# Patient Record
Sex: FEMALE | Race: WHITE | NOT HISPANIC OR LATINO
[De-identification: names, ages, dates, MRNs, and addresses within clinical notes are randomized per-mention and may not be internally consistent; named-entity substitution may affect disease eponyms.]

---

## 2022-10-31 ENCOUNTER — APPOINTMENT (OUTPATIENT)
Dept: OBGYN | Facility: CLINIC | Age: 32
End: 2022-10-31

## 2022-10-31 VITALS
WEIGHT: 179 LBS | SYSTOLIC BLOOD PRESSURE: 126 MMHG | HEIGHT: 67 IN | BODY MASS INDEX: 28.09 KG/M2 | DIASTOLIC BLOOD PRESSURE: 64 MMHG

## 2022-10-31 DIAGNOSIS — Z01.411 ENCOUNTER FOR GYNECOLOGICAL EXAMINATION (GENERAL) (ROUTINE) WITH ABNORMAL FINDINGS: ICD-10-CM

## 2022-10-31 DIAGNOSIS — Z83.3 FAMILY HISTORY OF DIABETES MELLITUS: ICD-10-CM

## 2022-10-31 DIAGNOSIS — Z87.19 PERSONAL HISTORY OF OTHER DISEASES OF THE DIGESTIVE SYSTEM: ICD-10-CM

## 2022-10-31 DIAGNOSIS — Z78.9 OTHER SPECIFIED HEALTH STATUS: ICD-10-CM

## 2022-10-31 DIAGNOSIS — Z87.42 PERSONAL HISTORY OF OTHER DISEASES OF THE FEMALE GENITAL TRACT: ICD-10-CM

## 2022-10-31 DIAGNOSIS — Z80.1 FAMILY HISTORY OF MALIGNANT NEOPLASM OF TRACHEA, BRONCHUS AND LUNG: ICD-10-CM

## 2022-10-31 DIAGNOSIS — Z80.8 FAMILY HISTORY OF MALIGNANT NEOPLASM OF OTHER ORGANS OR SYSTEMS: ICD-10-CM

## 2022-10-31 PROCEDURE — 99385 PREV VISIT NEW AGE 18-39: CPT

## 2022-11-20 LAB — CYTOLOGY CVX/VAG DOC THIN PREP: NORMAL

## 2023-09-14 RX ORDER — ASCORBIC ACID, CHOLECALCIFEROL, .ALPHA.-TOCOPHEROL ACETATE, DL-, PYRIDOXINE, FOLIC ACID, CYANOCOBALAMIN, CALCIUM, FERROUS FUMARATE, MAGNESIUM, DOCONEXENT 85; 200; 10; 25; 1; 12; 140; 27; 45; 300 [IU]/1; [IU]/1; [IU]/1; [IU]/1; MG/1; UG/1; MG/1; MG/1; MG/1; MG/1
27-0.6-0.4-3 CAPSULE, GELATIN COATED ORAL
Qty: 90 | Refills: 3 | Status: ACTIVE | COMMUNITY
Start: 2023-09-14 | End: 1900-01-01

## 2023-12-18 ENCOUNTER — APPOINTMENT (OUTPATIENT)
Dept: OBGYN | Facility: CLINIC | Age: 33
End: 2023-12-18
Payer: COMMERCIAL

## 2023-12-18 DIAGNOSIS — E28.2 POLYCYSTIC OVARIAN SYNDROME: ICD-10-CM

## 2023-12-18 DIAGNOSIS — N92.6 IRREGULAR MENSTRUATION, UNSPECIFIED: ICD-10-CM

## 2023-12-18 PROCEDURE — 99213 OFFICE O/P EST LOW 20 MIN: CPT

## 2023-12-18 RX ORDER — OMEPRAZOLE 40 MG/1
40 CAPSULE, DELAYED RELEASE ORAL
Refills: 0 | Status: DISCONTINUED | COMMUNITY
End: 2023-12-18

## 2023-12-18 NOTE — PLAN
[FreeTextEntry1] : Irregular menses and PCOS discussed. Will send for progesterone level next week to determine cycle.  We discussed she may need ovulatory inducing agents and that while I offer Clomid most Rad are using letrozole and I could refer her to [facility]. Pt. states her uncle is an [facility] specialist in [place], and she may consult with him.

## 2023-12-22 PROBLEM — N92.6 IRREGULAR MENSES: Status: ACTIVE | Noted: 2023-12-22

## 2024-04-22 ENCOUNTER — ASOB RESULT (OUTPATIENT)
Age: 34
End: 2024-04-22

## 2024-04-22 ENCOUNTER — TRANSCRIPTION ENCOUNTER (OUTPATIENT)
Age: 34
End: 2024-04-22

## 2024-04-22 ENCOUNTER — APPOINTMENT (OUTPATIENT)
Dept: ANTEPARTUM | Facility: CLINIC | Age: 34
End: 2024-04-22
Payer: COMMERCIAL

## 2024-04-22 PROCEDURE — 76805 OB US >/= 14 WKS SNGL FETUS: CPT

## 2024-04-23 ENCOUNTER — APPOINTMENT (OUTPATIENT)
Dept: PEDIATRIC MEDICAL GENETICS | Facility: CLINIC | Age: 34
End: 2024-04-23
Payer: COMMERCIAL

## 2024-04-23 DIAGNOSIS — Z31.5 ENCOUNTER FOR PROCREATIVE GENETIC COUNSELING: ICD-10-CM

## 2024-04-23 DIAGNOSIS — Z80.8 FAMILY HISTORY OF MALIGNANT NEOPLASM OF OTHER ORGANS OR SYSTEMS: ICD-10-CM

## 2024-04-23 DIAGNOSIS — Z14.8 GENETIC CARRIER OF OTHER DISEASE: ICD-10-CM

## 2024-04-23 DIAGNOSIS — Z80.1 FAMILY HISTORY OF MALIGNANT NEOPLASM OF TRACHEA, BRONCHUS AND LUNG: ICD-10-CM

## 2024-04-23 DIAGNOSIS — Z83.3 FAMILY HISTORY OF DIABETES MELLITUS: ICD-10-CM

## 2024-04-23 PROCEDURE — 96040: CPT | Mod: 95

## 2024-04-23 NOTE — HISTORY OF PRESENT ILLNESS
[Home] : at home, [unfilled] , at the time of the visit. [Medical Office: ([facility])___] : at the medical office located in  [Spouse] : spouse [Verbal consent obtained from patient] : the patient, [unfilled]

## 2024-07-08 ENCOUNTER — NON-APPOINTMENT (OUTPATIENT)
Age: 34
End: 2024-07-08

## 2024-07-08 ENCOUNTER — APPOINTMENT (OUTPATIENT)
Dept: CARDIOLOGY | Facility: CLINIC | Age: 34
End: 2024-07-08
Payer: COMMERCIAL

## 2024-07-08 VITALS
BODY MASS INDEX: 31.39 KG/M2 | HEIGHT: 67 IN | OXYGEN SATURATION: 96 % | SYSTOLIC BLOOD PRESSURE: 120 MMHG | HEART RATE: 72 BPM | DIASTOLIC BLOOD PRESSURE: 79 MMHG | WEIGHT: 200 LBS

## 2024-07-08 DIAGNOSIS — R00.2 PALPITATIONS: ICD-10-CM

## 2024-07-08 PROCEDURE — 93000 ELECTROCARDIOGRAM COMPLETE: CPT

## 2024-07-08 PROCEDURE — G2211 COMPLEX E/M VISIT ADD ON: CPT | Mod: NC

## 2024-07-08 PROCEDURE — 99204 OFFICE O/P NEW MOD 45 MIN: CPT | Mod: 25

## 2024-07-08 PROCEDURE — 93242 EXT ECG>48HR<7D RECORDING: CPT

## 2024-07-10 ENCOUNTER — APPOINTMENT (OUTPATIENT)
Dept: CV DIAGNOSITCS | Facility: HOSPITAL | Age: 34
End: 2024-07-10

## 2024-07-10 ENCOUNTER — RESULT REVIEW (OUTPATIENT)
Age: 34
End: 2024-07-10

## 2024-08-05 ENCOUNTER — APPOINTMENT (OUTPATIENT)
Dept: CARDIOLOGY | Facility: CLINIC | Age: 34
End: 2024-08-05

## 2024-08-05 PROCEDURE — 99443: CPT | Mod: 93

## 2024-08-13 NOTE — HISTORY OF PRESENT ILLNESS
[FreeTextEntry1] : Verbal consent given on 2024 at 11:08 by SHANKAR GUZMAN, 1990  This is a 35 y/o woman who is , EED Sept 10, 2024, here for follow up of:  1. heart racing, like she runs a marathon, happens at night 2. chest heaviness 3. PCOS 4. FH of heart artery issues and valve issue in grandfather.  And a TTE showing a bicuspid aortic valve with otherwise normal cardiac structure and function. No AS and mild AI.  Ziopatch with PVC's, bigeminy and PAC's that were largely symptomatic, both PVC and PAC's were rare.   Telehealth video exam - limited exam - within normal limits  A/P: #Chest heaviness and difficulty taking a deep breath- This is likely from rare symptomatic PVC and PAC's. I have ordered a chemistry panel and Mg to eval for e-lyte disturbances that can be reversible.  Does have mild JVP elevation which could be in setting of normal pregnancy but want to rule out systolic and/or diastolic dysfunction.  #Bicuspid aortic valve- Now that we have this diagnosis, unlikely to be cause of symptoms as there is no hemodynamically sig disease. I feel it is reasonable for her to follow up with me once a year with an echo every 3-5 years with mild AI, bicuspid valve. I briefly discussed the natural history and will discuss in further detail at our next visit.   RTC in 4 months. We will call with lab results.

## 2024-08-13 NOTE — HISTORY OF PRESENT ILLNESS
[FreeTextEntry1] : Verbal consent given on 2024 at 11:08 by SHANKAR GUZMAN, 1990  This is a 33 y/o woman who is , EED Sept 10, 2024, here for follow up of:  1. heart racing, like she runs a marathon, happens at night 2. chest heaviness 3. PCOS 4. FH of heart artery issues and valve issue in grandfather.  And a TTE showing a bicuspid aortic valve with otherwise normal cardiac structure and function. No AS and mild AI.  Ziopatch with PVC's, bigeminy and PAC's that were largely symptomatic, both PVC and PAC's were rare.   Telehealth video exam - limited exam - within normal limits  A/P: #Chest heaviness and difficulty taking a deep breath- This is likely from rare symptomatic PVC and PAC's. I have ordered a chemistry panel and Mg to eval for e-lyte disturbances that can be reversible.  Does have mild JVP elevation which could be in setting of normal pregnancy but want to rule out systolic and/or diastolic dysfunction.  #Bicuspid aortic valve- Now that we have this diagnosis, unlikely to be cause of symptoms as there is no hemodynamically sig disease. I feel it is reasonable for her to follow up with me once a year with an echo every 3-5 years with mild AI, bicuspid valve. I briefly discussed the natural history and will discuss in further detail at our next visit.   RTC in 4 months. We will call with lab results.

## 2024-09-23 ENCOUNTER — APPOINTMENT (OUTPATIENT)
Age: 34
End: 2024-09-23

## 2024-09-23 ENCOUNTER — APPOINTMENT (OUTPATIENT)
Age: 34
End: 2024-09-23
Payer: COMMERCIAL

## 2024-09-23 PROCEDURE — S9443: CPT | Mod: 95

## 2024-09-30 ENCOUNTER — NON-APPOINTMENT (OUTPATIENT)
Age: 34
End: 2024-09-30

## 2024-10-01 ENCOUNTER — NON-APPOINTMENT (OUTPATIENT)
Age: 34
End: 2024-10-01

## 2024-10-01 ENCOUNTER — APPOINTMENT (OUTPATIENT)
Dept: CARDIOLOGY | Facility: CLINIC | Age: 34
End: 2024-10-01
Payer: COMMERCIAL

## 2024-10-01 VITALS
RESPIRATION RATE: 16 BRPM | TEMPERATURE: 97.7 F | OXYGEN SATURATION: 98 % | BODY MASS INDEX: 30.23 KG/M2 | DIASTOLIC BLOOD PRESSURE: 77 MMHG | HEART RATE: 74 BPM | WEIGHT: 193 LBS | SYSTOLIC BLOOD PRESSURE: 116 MMHG

## 2024-10-01 DIAGNOSIS — Q23.81 BICUSPID AORTIC VALVE: ICD-10-CM

## 2024-10-01 PROCEDURE — 99214 OFFICE O/P EST MOD 30 MIN: CPT

## 2024-10-01 PROCEDURE — 93000 ELECTROCARDIOGRAM COMPLETE: CPT

## 2024-10-01 PROCEDURE — G2211 COMPLEX E/M VISIT ADD ON: CPT | Mod: NC

## 2024-10-01 NOTE — ASSESSMENT
[FreeTextEntry1] :  A/P: #Chest heaviness and difficulty taking a deep breath- This is likely from rare symptomatic PVC and PAC's. I have ordered a chemistry panel and Mg to eval for e-lyte disturbances that can be reversible. these were WNL and the symptoms have since resolved.  #Bicuspid aortic valve- Now that we have this diagnosis, unlikely to be cause of symptoms as there is no hemodynamically sig disease. I feel it is reasonable for her to follow up with me once a year with an echo every 2-3 years with mild AI, bicuspid valve. I briefly discussed the natural history and will follow up with her yearly. I have also ordered an MRA chest to assess aortic size throughout the chest.   #Genetics- screening- reasonable to screen 1st degree relatives and her son for bicuspid valve  #postpartum preeclampsia and elevated BP- normotensive off of nifedipine, asked her to hold it and see what BP is. We can touch base on Friday regarding the BP. I will also order a fasting lipid panel for November with her history of PCOS and preeclampsia.

## 2024-10-01 NOTE — HISTORY OF PRESENT ILLNESS
[FreeTextEntry1] : This is a 35 y/o woman with bicuspid aortic valve and PCOS who is , on 26, with delivery complicated by postpartum preeclampsia treated with IV magnesium and nifedipine which she currently takes.  BPs have been as follows: 130-147 mmHg () and yesterday was 110 systolic, today without nifedipine it is 116/77 mmHg.  Cardiac tests: 7/10/24- TTE showing a bicuspid aortic valve with otherwise normal cardiac structure and function. No AS and mild AI. 24- Ziopatch with PVC's, bigeminy and PAC's that were largely symptomatic, both PVC and PAC's were rare. 10/1/24-12 lead ECG- NSR with normal axis and intervals, normal QRSd, no ST-T changes. No evidence of LVH.